# Patient Record
Sex: FEMALE
[De-identification: names, ages, dates, MRNs, and addresses within clinical notes are randomized per-mention and may not be internally consistent; named-entity substitution may affect disease eponyms.]

---

## 2023-07-31 ENCOUNTER — TRANSCRIPTION ENCOUNTER (OUTPATIENT)
Age: 68
End: 2023-07-31

## 2023-07-31 ENCOUNTER — APPOINTMENT (OUTPATIENT)
Dept: ORTHOPEDIC SURGERY | Facility: CLINIC | Age: 68
End: 2023-07-31
Payer: MEDICARE

## 2023-07-31 PROBLEM — Z00.00 ENCOUNTER FOR PREVENTIVE HEALTH EXAMINATION: Status: ACTIVE | Noted: 2023-07-31

## 2023-07-31 PROCEDURE — 73562 X-RAY EXAM OF KNEE 3: CPT | Mod: RT

## 2023-07-31 PROCEDURE — 20610 DRAIN/INJ JOINT/BURSA W/O US: CPT | Mod: RT

## 2023-07-31 PROCEDURE — 99203 OFFICE O/P NEW LOW 30 MIN: CPT | Mod: 25

## 2023-07-31 PROCEDURE — J3490M: CUSTOM | Mod: NC

## 2023-07-31 NOTE — PHYSICAL EXAM
[NL (0)] : extension 0 degrees [5___] : hamstring 5[unfilled]/5 [] : patient ambulates without assistive device [Right] : right knee [Degenerative change] : Degenerative change [TWNoteComboBox7] : flexion 135 degrees

## 2023-07-31 NOTE — PROCEDURE
[FreeTextEntry3] : Large joint corticosteroid injection given: right knee  Patient indicated for injection after trial of rest, OTC medications including aspirin, Ibuprofen, Aleve etc or prescription NSAIDS, and/or exercises at home and/ or physical therapy without satisfactory response.  Patient has symptoms including pain, swelling, and/or decreased mobility in the joint. The risks, benefits, and alternatives to corticosteroid injection were explained in full to the patient, including but not limited to infection, sepsis, bleeding, scarring, skin discoloration, temporary increase in pain, syncopal episode, failure to resolve symptoms, allergic reaction, symptom recurrence, and elevation of blood sugar in diabetics. Patient understood the risks. All questions were answered. After discussion of options, patient requested an injection.   Oral informed consent was obtained and sterile technique was utilized for the procedure including the preparation of the solutions used for the injection and betadine followed by alcohol prep to the injection site. Anesthesia was given with ethyl chloride sprayed topically. The injection was delivered. Patient tolerated the procedure well.   Post Procedure Instructions: Patient was advised to call if redness, pain, or fever occur and apply ice for 15 min on and 15 min off later today  Medications delivered: 2 cc celestone, 3 cc lidocaine, 3 cc marcaine

## 2023-07-31 NOTE — HISTORY OF PRESENT ILLNESS
[Gradual] : gradual [Dull/Aching] : dull/aching [Localized] : localized [Intermittent] : intermittent [Leisure] : leisure [Rest] : rest [Ice] : ice [Walking] : walking [Stairs] : stairs [de-identified] : Pt is a 68 year old F who presents today for evaluation of their right knee. Pt states that she was walking her dog about 3-4wks ago and developed pain along the medial knee and patellar tendon. Goes to cycle bar. Denies locking giving out. Denies trauma/previous injury. No numbness/tingling. Ice to affected area, Biofreeze. No formal treatment to date. WB in sneakers. Initial difficulty with stairs.  [] : no [FreeTextEntry1] : R knee

## 2023-07-31 NOTE — ASSESSMENT
[FreeTextEntry1] : Xrays reviewed with patient Treatment options discussed  Injection done today, tolerated well Recommend course of PT/HEP Follow up with Dr. Messer in 2 weeks

## 2023-08-09 ENCOUNTER — APPOINTMENT (OUTPATIENT)
Dept: MRI IMAGING | Facility: CLINIC | Age: 68
End: 2023-08-09
Payer: MEDICARE

## 2023-08-09 ENCOUNTER — APPOINTMENT (OUTPATIENT)
Dept: ORTHOPEDIC SURGERY | Facility: CLINIC | Age: 68
End: 2023-08-09
Payer: MEDICARE

## 2023-08-09 VITALS — HEIGHT: 64 IN | BODY MASS INDEX: 26.12 KG/M2 | WEIGHT: 153 LBS

## 2023-08-09 DIAGNOSIS — M23.91 UNSPECIFIED INTERNAL DERANGEMENT OF RIGHT KNEE: ICD-10-CM

## 2023-08-09 PROCEDURE — 73721 MRI JNT OF LWR EXTRE W/O DYE: CPT | Mod: RT,MH

## 2023-08-09 PROCEDURE — 99213 OFFICE O/P EST LOW 20 MIN: CPT

## 2023-08-09 NOTE — IMAGING
[de-identified] : Mild effusion, no warmth, no ecchymosis Medial joint line tenderness to palpation Range of motion 0-130 5/5 quadriceps and hamstring strength Positive Tram No varus or valgus instability, negative lachman Motor and sensory intact distally Mildly antalgic gait

## 2023-08-09 NOTE — HISTORY OF PRESENT ILLNESS
[7] : 7 [Dull/Aching] : dull/aching [Ice] : ice [] : no [FreeTextEntry1] : R knee [FreeTextEntry3] : a month ago [FreeTextEntry5] : no injury. hard to bend. saw [facility]-helped

## 2023-08-09 NOTE — ASSESSMENT
[FreeTextEntry1] : Persistent medial joint pain and now with intermittent mechanical symptoms after injury while walking her dog.  No relief with a corticosteroid injection at her urgent care and x-rays showing minimal degenerative changes.  My suspicion is subchondral fracture of the medial tibial plateau or possibly a medial meniscal tear.  Will obtain an MRI to evaluate and follow-up to review next steps in treatment.

## 2023-08-16 ENCOUNTER — APPOINTMENT (OUTPATIENT)
Dept: ORTHOPEDIC SURGERY | Facility: CLINIC | Age: 68
End: 2023-08-16
Payer: MEDICARE

## 2023-08-16 VITALS — WEIGHT: 153 LBS | BODY MASS INDEX: 26.12 KG/M2 | HEIGHT: 64 IN

## 2023-08-16 DIAGNOSIS — M17.11 UNILATERAL PRIMARY OSTEOARTHRITIS, RIGHT KNEE: ICD-10-CM

## 2023-08-16 DIAGNOSIS — S83.231D COMPLEX TEAR OF MEDIAL MENISCUS, CURRENT INJURY, RIGHT KNEE, SUBSEQUENT ENCOUNTER: ICD-10-CM

## 2023-08-16 PROCEDURE — 99214 OFFICE O/P EST MOD 30 MIN: CPT

## 2023-08-16 NOTE — ASSESSMENT
[FreeTextEntry1] : I personally reviewed and interpreted the MRI images in detail.  She does have a small area of subchondral edema in the medial tibial plateau suggestive of acute injury.  The medial meniscus could be degenerative in nature we had a long discussion about the findings.  She is not having ashwin mechanical symptoms since the last visit therefore reasonable to hold off any surgery until the edema resolves.  We will slowly try to get back into cycling and low impact exercise.  Explained if this is truly a traumatic medial meniscus tear this probably will not get better over time and may eventually require arthroscopic meniscectomy.  The lateral meniscus seems degenerative in nature as she has no pain laterally.  She reports no pain prior to this therefore I do not believe this is an arthritis issue at all.

## 2023-08-16 NOTE — IMAGING
[de-identified] : No effusion, no warmth, no ecchymosis Medial joint line tenderness to palpation  Range of motion 0-130 with mild anterior crepitus 5/5 quadriceps and hamstring strength Ligamentously stable Motor and sensory intact distally Non antalgic gait Negative Ace
